# Patient Record
Sex: MALE | Race: WHITE | NOT HISPANIC OR LATINO | Employment: UNEMPLOYED | URBAN - METROPOLITAN AREA
[De-identification: names, ages, dates, MRNs, and addresses within clinical notes are randomized per-mention and may not be internally consistent; named-entity substitution may affect disease eponyms.]

---

## 2020-02-16 ENCOUNTER — OFFICE VISIT (OUTPATIENT)
Dept: URGENT CARE | Facility: CLINIC | Age: 65
End: 2020-02-16
Payer: MEDICARE

## 2020-02-16 VITALS
HEIGHT: 74 IN | BODY MASS INDEX: 40.43 KG/M2 | HEART RATE: 83 BPM | SYSTOLIC BLOOD PRESSURE: 152 MMHG | OXYGEN SATURATION: 98 % | DIASTOLIC BLOOD PRESSURE: 85 MMHG | TEMPERATURE: 98.3 F | WEIGHT: 315 LBS | RESPIRATION RATE: 24 BRPM

## 2020-02-16 DIAGNOSIS — M54.42 ACUTE LEFT-SIDED LOW BACK PAIN WITH LEFT-SIDED SCIATICA: ICD-10-CM

## 2020-02-16 DIAGNOSIS — E66.01 MORBID OBESITY (HCC): Primary | ICD-10-CM

## 2020-02-16 DIAGNOSIS — M54.12 CERVICAL RADICULOPATHY: ICD-10-CM

## 2020-02-16 PROCEDURE — G0463 HOSPITAL OUTPT CLINIC VISIT: HCPCS | Performed by: EMERGENCY MEDICINE

## 2020-02-16 PROCEDURE — 99203 OFFICE O/P NEW LOW 30 MIN: CPT | Performed by: EMERGENCY MEDICINE

## 2020-02-16 RX ORDER — FUROSEMIDE 40 MG/1
TABLET ORAL
COMMUNITY
Start: 2020-01-15

## 2020-02-16 RX ORDER — METOLAZONE 5 MG/1
TABLET ORAL
COMMUNITY
Start: 2020-01-15

## 2020-02-16 RX ORDER — CYCLOBENZAPRINE HCL 10 MG
10 TABLET ORAL 3 TIMES DAILY PRN
Qty: 20 TABLET | Refills: 0 | Status: SHIPPED | OUTPATIENT
Start: 2020-02-16

## 2020-02-16 NOTE — PATIENT INSTRUCTIONS
Cervical Radiculopathy   WHAT YOU NEED TO KNOW:   Cervical radiculopathy is a painful condition that happens when a spinal nerve in your neck is pinched or irritated  DISCHARGE INSTRUCTIONS:   Medicines: You may need any of the following:  · NSAIDs  help decrease swelling and pain  This medicine can be bought without a doctor's order  This medicine can cause stomach bleeding or kidney problems in certain people  If you take blood thinner medicine, always ask your healthcare provider if NSAIDs are safe for you  Always read the medicine label and follow the directions on it before using this medicine  · Prescription pain medicine  helps decrease pain  Do not wait until the pain is severe before you take this medicine  · Steroids  help decrease pain and swelling  These may be given as a pill or as an injection in your neck  You may need more than 1 injection if your symptoms do not improve after the first treatment  · Take your medicine as directed  Contact your healthcare provider if you think your medicine is not helping or if you have side effects  Tell him of her if you are allergic to any medicine  Keep a list of the medicines, vitamins, and herbs you take  Include the amounts, and when and why you take them  Bring the list or the pill bottles to follow-up visits  Carry your medicine list with you in case of an emergency  Follow up with your healthcare provider or spine specialist as directed:  Write down your questions so you remember to ask them during your visits  Physical therapy:  Your healthcare provider may suggest physical therapy to stretch and strengthen your muscles  Your physical therapist can teach you how to improve your posture and the way you hold your neck  He may also teach you how to be safely active and avoid further injury  He can also help you develop an exercise program that is safe for your back and neck  Self-care:   · Ice  helps decrease swelling and pain   Ice may also help prevent tissue damage  Use an ice pack, or put crushed ice in a plastic bag  Cover it with a towel and place it on your neck for 15 to 20 minutes every hour or as directed  · Rest  when you feel it is needed  Slowly start to do more each day  Return to your daily activities as directed  · Wear a soft collar  You may be given a soft collar to support your neck while you sleep  Wear the soft collar only as directed  · Do light stretches and regular exercise  Your healthcare provider may suggest light stretches to help decrease stiffness in your neck and arm as you recover  After your pain is controlled, you may benefit from regular exercise  Ask what type of exercise is safe for your back and neck  · Review your work area  A comfortable work area can help prevent neck strain  Ask your employer for an ergonomic review to check the position of your desk, chair, phone, and computer  Make any necessary adjustments for your comfort  Contact your healthcare provider or spine specialist if:   · You have a fever  · You are losing weight without trying  · Your pain is worse, even with medicine  · One or both hands feel more numb than before, or you cannot move your fingers well  · You have questions or concerns about your condition or care  © 2017 [employer]  Information is for End User's use only and may not be sold, redistributed or otherwise used for commercial purposes  All illustrations and images included in CareNotes® are the copyrighted property of [employer]  or Richar Muñoz  The above information is an  only  It is not intended as medical advice for individual conditions or treatments  Talk to your doctor, nurse or pharmacist before following any medical regimen to see if it is safe and effective for you  Sciatica   WHAT YOU NEED TO KNOW:   Sciatica is a condition that causes pain along your sciatic nerve   The sciatic nerve runs from your spine through both sides of your buttocks  It then runs down the back of your thigh, into your lower leg and foot  Your sciatic nerve may be compressed, inflamed, irritated, or stretched  DISCHARGE INSTRUCTIONS:   Medicines:   · NSAIDs:  These medicines decrease swelling and pain  NSAIDs are available without a doctor's order  Ask your healthcare provider which medicine is right for you  Ask how much to take and when to take it  Take as directed  NSAIDs can cause stomach bleeding or kidney problems if not taken correctly  · Acetaminophen: This medicine decreases pain  Acetaminophen is available without a doctor's order  Ask how much to take and when to take it  Follow directions  Acetaminophen can cause liver damage if not taken correctly  · Muscle relaxers  help decrease pain and muscle spasms  · Take your medicine as directed  Contact your healthcare provider if you think your medicine is not helping or if you have side effects  Tell him of her if you are allergic to any medicine  Keep a list of the medicines, vitamins, and herbs you take  Include the amounts, and when and why you take them  Bring the list or the pill bottles to follow-up visits  Carry your medicine list with you in case of an emergency  Follow up with your healthcare provider as directed:  Write down your questions so you remember to ask them during your visits  Manage your symptoms:   · Activity:  Decrease your activity  Do not lift heavy objects or twist your back for at least 6 weeks  Slowly return to your usual activity  · Ice:  Ice helps decrease swelling and pain  Ice may also help prevent tissue damage  Use an ice pack, or put crushed ice in a plastic bag  Cover it with a towel and place it on your low back or leg for 15 to 20 minutes every hour or as directed  · Heat:  Heat helps decrease pain and muscle spasms  Apply heat on the area for 20 to 30 minutes every 2 hours for as many days as directed  · Physical therapy:  You may need to see physical therapist to teach you exercises to help improve movement and strength, and to decrease pain  An occupational therapist teaches you skills to help with your daily activities  · Use assistive devices if directed: You may need to wear back support, such as a back brace  You may need crutches, a cane, or a walker to decrease stress on your lower back and leg muscles  Ask your healthcare provider for more information about assistive devices and how to use them correctly  Self-care:   · Avoid pressure on your back and legs:  Do not  lift heavy objects, or stand or sit for long periods of time  · Lift objects safely:  Keep your back straight and bend your knees when you  an object  Do not bend or twist your back when you lift  · Maintain a healthy weight:  Ask your healthcare provider how much you should weigh  Ask him to help you create a weight loss plan if you are overweight  · Exercise:  Ask your healthcare provider about the best stretching, warmup, and exercise plan for you  Contact your healthcare provider if:   · You have pain in your lower back at night or when resting  · You have pain in your lower back with numbness below the knee  · You have weakness in one leg only  · You have questions or concerns about your condition or care  Return to the emergency department if:   · You have trouble holding back your urine or bowel movements  · You have weakness in both legs  · You have numbness in your groin or buttocks  © 2017 [facility] Information is for End User's use only and may not be sold, redistributed or otherwise used for commercial purposes  All illustrations and images included in CareNotes® are the copyrighted property of [employer]  or Richar Muñoz  The above information is an  only  It is not intended as medical advice for individual conditions or treatments   Talk to your doctor, nurse or pharmacist before following any medical regimen to see if it is safe and effective for you

## 2020-02-16 NOTE — PROGRESS NOTES
[facility] [employer] [facility]        NAME: Cyndi Ward is a 71 y o  male  : 1955    MRN: [id number]  DATE: 2020  TIME: 12:02 PM    Assessment and Plan   Morbid obesity ([facility] [address] ) [E66 01]  1  Morbid obesity ([facility] [address] )     2  Acute left-sided low back pain with left-sided sciatica  cyclobenzaprine (FLEXERIL) 10 mg tablet    Ambulatory referral to Physical Therapy   3  Cervical radiculopathy  cyclobenzaprine (FLEXERIL) 10 mg tablet    Ambulatory referral to Physical Therapy         Patient Instructions     Patient Instructions     Cervical Radiculopathy   WHAT YOU NEED TO KNOW:   Cervical radiculopathy is a painful condition that happens when a spinal nerve in your neck is pinched or irritated  DISCHARGE INSTRUCTIONS:   Medicines: You may need any of the following:  · NSAIDs  help decrease swelling and pain  This medicine can be bought without a doctor's order  This medicine can cause stomach bleeding or kidney problems in certain people  If you take blood thinner medicine, always ask your healthcare provider if NSAIDs are safe for you  Always read the medicine label and follow the directions on it before using this medicine  · Prescription pain medicine  helps decrease pain  Do not wait until the pain is severe before you take this medicine  · Steroids  help decrease pain and swelling  These may be given as a pill or as an injection in your neck  You may need more than 1 injection if your symptoms do not improve after the first treatment  · Take your medicine as directed  Contact your healthcare provider if you think your medicine is not helping or if you have side effects  Tell him of her if you are allergic to any medicine  Keep a list of the medicines, vitamins, and herbs you take  Include the amounts, and when and why you take them  Bring the list or the pill bottles to follow-up visits  Carry your medicine list with you in case of an emergency    Follow up with your healthcare provider or spine specialist as directed:  Write down your questions so you remember to ask them during your visits  Physical therapy:  Your healthcare provider may suggest physical therapy to stretch and strengthen your muscles  Your physical therapist can teach you how to improve your posture and the way you hold your neck  He may also teach you how to be safely active and avoid further injury  He can also help you develop an exercise program that is safe for your back and neck  Self-care:   · Ice  helps decrease swelling and pain  Ice may also help prevent tissue damage  Use an ice pack, or put crushed ice in a plastic bag  Cover it with a towel and place it on your neck for 15 to 20 minutes every hour or as directed  · Rest  when you feel it is needed  Slowly start to do more each day  Return to your daily activities as directed  · Wear a soft collar  You may be given a soft collar to support your neck while you sleep  Wear the soft collar only as directed  · Do light stretches and regular exercise  Your healthcare provider may suggest light stretches to help decrease stiffness in your neck and arm as you recover  After your pain is controlled, you may benefit from regular exercise  Ask what type of exercise is safe for your back and neck  · Review your work area  A comfortable work area can help prevent neck strain  Ask your employer for an ergonomic review to check the position of your desk, chair, phone, and computer  Make any necessary adjustments for your comfort  Contact your healthcare provider or spine specialist if:   · You have a fever  · You are losing weight without trying  · Your pain is worse, even with medicine  · One or both hands feel more numb than before, or you cannot move your fingers well  · You have questions or concerns about your condition or care    © 2017 [employer] Information is for End User's use only and may not be sold, redistributed or otherwise used for commercial purposes  All illustrations and images included in CareNotes® are the copyrighted property of [employer]  or Richar Muñoz  The above information is an  only  It is not intended as medical advice for individual conditions or treatments  Talk to your doctor, nurse or pharmacist before following any medical regimen to see if it is safe and effective for you  Sciatica   WHAT YOU NEED TO KNOW:   Sciatica is a condition that causes pain along your sciatic nerve  The sciatic nerve runs from your spine through both sides of your buttocks  It then runs down the back of your thigh, into your lower leg and foot  Your sciatic nerve may be compressed, inflamed, irritated, or stretched  DISCHARGE INSTRUCTIONS:   Medicines:   · NSAIDs:  These medicines decrease swelling and pain  NSAIDs are available without a doctor's order  Ask your healthcare provider which medicine is right for you  Ask how much to take and when to take it  Take as directed  NSAIDs can cause stomach bleeding or kidney problems if not taken correctly  · Acetaminophen: This medicine decreases pain  Acetaminophen is available without a doctor's order  Ask how much to take and when to take it  Follow directions  Acetaminophen can cause liver damage if not taken correctly  · Muscle relaxers  help decrease pain and muscle spasms  · Take your medicine as directed  Contact your healthcare provider if you think your medicine is not helping or if you have side effects  Tell him of her if you are allergic to any medicine  Keep a list of the medicines, vitamins, and herbs you take  Include the amounts, and when and why you take them  Bring the list or the pill bottles to follow-up visits  Carry your medicine list with you in case of an emergency  Follow up with your healthcare provider as directed:  Write down your questions so you remember to ask them during your visits     Manage your symptoms:   · Activity:  Decrease your activity  Do not lift heavy objects or twist your back for at least 6 weeks  Slowly return to your usual activity  · Ice:  Ice helps decrease swelling and pain  Ice may also help prevent tissue damage  Use an ice pack, or put crushed ice in a plastic bag  Cover it with a towel and place it on your low back or leg for 15 to 20 minutes every hour or as directed  · Heat:  Heat helps decrease pain and muscle spasms  Apply heat on the area for 20 to 30 minutes every 2 hours for as many days as directed  · Physical therapy:  You may need to see physical therapist to teach you exercises to help improve movement and strength, and to decrease pain  An occupational therapist teaches you skills to help with your daily activities  · Use assistive devices if directed: You may need to wear back support, such as a back brace  You may need crutches, a cane, or a walker to decrease stress on your lower back and leg muscles  Ask your healthcare provider for more information about assistive devices and how to use them correctly  Self-care:   · Avoid pressure on your back and legs:  Do not  lift heavy objects, or stand or sit for long periods of time  · Lift objects safely:  Keep your back straight and bend your knees when you  an object  Do not bend or twist your back when you lift  · Maintain a healthy weight:  Ask your healthcare provider how much you should weigh  Ask him to help you create a weight loss plan if you are overweight  · Exercise:  Ask your healthcare provider about the best stretching, warmup, and exercise plan for you  Contact your healthcare provider if:   · You have pain in your lower back at night or when resting  · You have pain in your lower back with numbness below the knee  · You have weakness in one leg only  · You have questions or concerns about your condition or care    Return to the emergency department if:   · You have trouble holding back your urine or bowel movements  · You have weakness in both legs  · You have numbness in your groin or buttocks  © 2017 [employer]  Information is for End User's use only and may not be sold, redistributed or otherwise used for commercial purposes  All illustrations and images included in CareNotes® are the copyrighted property of [employer]  or Richar Muñoz  The above information is an  only  It is not intended as medical advice for individual conditions or treatments  Talk to your doctor, nurse or pharmacist before following any medical regimen to see if it is safe and effective for you  Follow up with PCP in 3-5 days  Proceed to  ER if symptoms worsen  Chief Complaint     Chief Complaint   Patient presents with    Back Pain     low back pain that started 1 week ago that frequently radiates down left leg  Pt also reports right hand numbness  +pain across shoulder           History of Present Illness       Patient complains of gradual onset left low back pain with radiation down left leg and right shoulder pain with radiation to right arm  He denies similar symptoms in the past but does repetitive and heavy lifting at work  Review of Systems   Review of Systems   Constitutional: Negative for chills and fever  Respiratory: Negative  Cardiovascular: Negative  Musculoskeletal: Positive for back pain, neck pain and neck stiffness  Negative for arthralgias, gait problem, joint swelling and myalgias  Skin: Negative for color change, pallor, rash and wound  Neurological: Negative for weakness, numbness and headaches           Current Medications       Current Outpatient Medications:     furosemide (LASIX) 40 mg tablet, , Disp: , Rfl:     metolazone (ZAROXOLYN) 5 mg tablet, , Disp: , Rfl:     cyclobenzaprine (FLEXERIL) 10 mg tablet, Take 1 tablet (10 mg total) by mouth 3 (three) times a day as needed for muscle spasms, Disp: 20 tablet, Rfl: 0    Current Allergies     Allergies as of 02/16/2020 - Reviewed 02/16/2020   Allergen Reaction Noted    Cephalexin Rash 02/16/2020            The following portions of the patient's history were reviewed and updated as appropriate: allergies, current medications, past family history, past medical history, past social history, past surgical history and problem list      Past Medical History:   Diagnosis Date    Cellulitis        History reviewed  No pertinent surgical history  History reviewed  No pertinent family history  Medications have been verified  Objective   /85   Pulse 83   Temp 98 3 °F (36 8 °C)   Resp (!) 24   Ht 6' 2" (1 88 m)   Wt (!) 188 kg (415 lb)   SpO2 98%   BMI 53 28 kg/m²        Physical Exam     Physical Exam   Constitutional: He is oriented to person, place, and time  He appears well-developed and well-nourished  No distress  Neck: Neck supple  Cardiovascular: Normal rate and regular rhythm  Pulmonary/Chest: Effort normal and breath sounds normal    Musculoskeletal: He exhibits tenderness  He exhibits no deformity  Tender SI joint on the left and at posterior shoulder on the right   Neurological: He is alert and oriented to person, place, and time  He has normal reflexes  Skin: Skin is warm and dry  No rash noted  No erythema  Psychiatric: He has a normal mood and affect  His behavior is normal  Judgment and thought content normal    Nursing note and vitals reviewed

## 2020-02-18 ENCOUNTER — EVALUATION (OUTPATIENT)
Dept: PHYSICAL THERAPY | Facility: CLINIC | Age: 65
End: 2020-02-18
Payer: MEDICARE

## 2020-02-18 DIAGNOSIS — M54.42 ACUTE LEFT-SIDED LOW BACK PAIN WITH LEFT-SIDED SCIATICA: ICD-10-CM

## 2020-02-18 DIAGNOSIS — M54.12 CERVICAL RADICULOPATHY: Primary | ICD-10-CM

## 2020-02-18 PROCEDURE — 97110 THERAPEUTIC EXERCISES: CPT | Performed by: PHYSICAL THERAPIST

## 2020-02-18 PROCEDURE — 97162 PT EVAL MOD COMPLEX 30 MIN: CPT | Performed by: PHYSICAL THERAPIST

## 2020-02-18 NOTE — LETTER
2020    Ian Collado DO  [facility]    Patient: David Barber III   YOB: 1955   Date of Visit: 2020     Encounter Diagnosis     ICD-10-CM    1  Cervical radiculopathy M54 12 Ambulatory referral to Physical Therapy   2  Acute left-sided low back pain with left-sided sciatica M54 42 Ambulatory referral to Physical Therapy       Dear Dr Kathleen Snyder: Thank you for your recent referral of David Barber III  Please review the attached evaluation summary from Mik's recent visit  Please verify that you agree with the plan of care by signing the attached order  If you have any questions or concerns, please do not hesitate to call  I sincerely appreciate the opportunity to share in the care of one of your patients and hope to have another opportunity to work with you in the near future  Sincerely,    Ale Cortés, PT      Referring Provider:      I certify that I have read the below Plan of Care and certify the need for these services furnished under this plan of treatment while under my care  Ian Collado DO  [place] [address]  VIA Facsimile: [phone]          PT Evaluation     Today's date: 2020  Patient name: Rupal ServinB: 1955  MRN: [id number]  Referring provider: Delilah Siu MD  Dx:   Encounter Diagnosis     ICD-10-CM    1  Cervical radiculopathy M54 12 Ambulatory referral to Physical Therapy   2  Acute left-sided low back pain with left-sided sciatica M54 42 Ambulatory referral to Physical Therapy                  Assessment  Assessment details: David Barber III presents with complaints of neck pain with radiating symptoms into R arm and low back pain with symptoms radiating into L leg  Neck and arm pain began insidiously 2 weeks ago   Since onset of pain, pt notes difficulty with gripping and lifting work related tasks due to tingling sensation into R hand  Pt's symptoms were not reproduced with cervical AROM, cervical spine joint mobilization or palpation of soft tissue  Pt symptoms are consistent with thoracic outlet syndrome due to postural muscular imbalance  Pt is currently most limited in work related and self care activities due to neck and upper extremity pain  Pt agreeable to starting treatment for neck involvement and address low back pain at future session is newly prescribed muscle relaxer does not help to relieve pain  Pt would benefit from skilled PT intervention to correct muscular imbalances, improve postural awareness and assure proper body mechanics for upper and lower body during work tasks  Understanding of Dx/Px/POC: good   Prognosis: good    Goals  STG to be achieved in 2 weeks:  1  Pt will increase cervical ROM by 5-10 degrees as compared to initial evaluation  2  Pt will demonstrate improved static standing/seated posture without need for verbal cues    LTG to be achieved within 6 weeks:  1  Pt will be able to complete work related tasks without complaints of pain in neck / RUE  2  Pt will be independent with HEP at time of discharge  Plan  Patient would benefit from: skilled physical therapy  Planned modality interventions: thermotherapy: hydrocollator packs and cryotherapy  Planned therapy interventions: joint mobilization, manual therapy, home exercise program, therapeutic exercise, therapeutic activities, strengthening, patient education, postural training and neuromuscular re-education  Frequency: 2x week  Duration in visits: 12  Duration in weeks: 6  Plan of Care beginning date: 2/18/2020  Plan of Care expiration date: 3/31/2020  Treatment plan discussed with: patient and family        Subjective Evaluation    History of Present Illness  Mechanism of injury: Pt present with recent onset of neck pain with radiating symptoms down into R hand  Pt reports insidious onset of pain starting about 2 weeks ago   Pt notes constant tingling sensation in R hand that "feels like it's asleep " Pt has noticed that gripping items with R hand is harder since onset of increased pain  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  Pt has not been to work in the last week due to pain and schedule flexibility due to off season  Pt has been taking ibuprofen intermittently and muscle relaxer since yesterday and notes some improvement in symptoms with medication  Pt has experienced on and off tingling over the years at work  Pain  Current pain ratin  At best pain ratin  At worst pain rating: 3  Relieving factors: medications and rest    Social Support    Employment status: working  Hand dominance: right    Treatments  Previous treatment: medication  Current treatment: medication and physical therapy  Patient Goals  Patient goals for therapy: decreased pain, increased strength, return to work and increased motion          Objective     Concurrent Complaints  Negative for dizziness, headaches and visual change    Postural Observations  Seated posture: poor  Standing posture: poor    Additional Postural Observation Details  Forward head rounded shoulder posture with cervical flexion and capital extension  Pt needed constant verbal cues to correct  Palpation     Additional Palpation Details  Pain was not recreated with soft tissue palpation of C/S, T/S, upper trap, paraspinals, scalenes or pec muscles  Tenderness   Cervical Spine   No tenderness in the spinous process       Neurological Testing     Sensation   Cervical/Thoracic   Left   Intact: light touch    Right   Intact: light touch    Active Range of Motion   Cervical/Thoracic Spine       Cervical    Flexion:  Restriction level: minimal  Extension:  Restriction level: minimal  Left lateral flexion:  Restriction level: moderate  Right lateral flexion:  Restriction level moderate  Left rotation:  Restriction level: moderate  Right rotation: Restriction level: moderate    Additional Active Range of Motion Details  Pt reported tightness with cervical ROM but no pain or radiating symptoms  Symptoms did not change with repeated flexion or extension  Strength/Myotome Testing     Left Shoulder     Planes of Motion   Flexion: 3+   Abduction: 3+     Right Shoulder     Planes of Motion   Flexion: 3+   Abduction: 3+     Tests   Cervical   Negative repeated extension and repeated flexion  Left Shoulder   Positive ULTT1  Right Shoulder   Positive ULTT1  General Comments:      Cervical/Thoracic Comments  FOTO scores initial eval:  Neck  Current: 58  Projected 71    Back  Current: 30  Projected: 59  Neuro Exam:     Headaches   Patient reports headaches: No        Flowsheet Rows      Most Recent Value   PT/OT G-Codes   Current Score  30   Projected Score  59        Patient was treated by HAZEL Guillaume under direct supervision of Donald Hull PT, DPT          Precautions: Fall risk     Daily Treatment Diary:      Initial Evaluation Date: 02/18/20  Compliance 2/18                     Visit Number 1                    Re-Eval  IE                 [facility]   Foto Captured Y                        Manual  2/18                                                                                                                                    Exercise Diary  2/18                     Self caudal distraction 10x10"                     C/S AROM 20x ea                     Scap retraction 20x5"                     Doorway stretch 4x30"                                                                                                                                                                                                                                                                                                                                                                                              Modalities  2/18

## 2020-02-18 NOTE — PROGRESS NOTES
PT Evaluation     Today's date: 2020  Patient name: Faraz Francis  : 1955  MRN: [id number]  Referring provider: Carrol Sanderson MD  Dx:   Encounter Diagnosis     ICD-10-CM    1  Cervical radiculopathy M54 12 Ambulatory referral to Physical Therapy   2  Acute left-sided low back pain with left-sided sciatica M54 42 Ambulatory referral to Physical Therapy                  Assessment  Assessment details: Roberta Madsen III presents with complaints of neck pain with radiating symptoms into R arm and low back pain with symptoms radiating into L leg  Neck and arm pain began insidiously 2 weeks ago  Since onset of pain, pt notes difficulty with gripping and lifting work related tasks due to tingling sensation into R hand  Pt's symptoms were not reproduced with cervical AROM, cervical spine joint mobilization or palpation of soft tissue  Pt symptoms are consistent with thoracic outlet syndrome due to postural muscular imbalance  Pt is currently most limited in work related and self care activities due to neck and upper extremity pain  Pt agreeable to starting treatment for neck involvement and address low back pain at future session is newly prescribed muscle relaxer does not help to relieve pain  Pt would benefit from skilled PT intervention to correct muscular imbalances, improve postural awareness and assure proper body mechanics for upper and lower body during work tasks  Understanding of Dx/Px/POC: good   Prognosis: good    Goals  STG to be achieved in 2 weeks:  1  Pt will increase cervical ROM by 5-10 degrees as compared to initial evaluation  2  Pt will demonstrate improved static standing/seated posture without need for verbal cues    LTG to be achieved within 6 weeks:  1  Pt will be able to complete work related tasks without complaints of pain in neck / RUE  2  Pt will be independent with HEP at time of discharge      Plan  Patient would benefit from: skilled physical therapy  Planned modality interventions: thermotherapy: hydrocollator packs and cryotherapy  Planned therapy interventions: joint mobilization, manual therapy, home exercise program, therapeutic exercise, therapeutic activities, strengthening, patient education, postural training and neuromuscular re-education  Frequency: 2x week  Duration in visits: 12  Duration in weeks: 6  Plan of Care beginning date: 2020  Plan of Care expiration date: 3/31/2020  Treatment plan discussed with: patient and family        Subjective Evaluation    History of Present Illness  Mechanism of injury: Pt present with recent onset of neck pain with radiating symptoms down into R hand  Pt reports insidious onset of pain starting about 2 weeks ago  Pt notes constant tingling sensation in R hand that "feels like it's asleep " Pt has noticed that gripping items with R hand is harder since onset of increased pain  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  Pt has not been to work in the last week due to pain and schedule flexibility due to off season  Pt has been taking ibuprofen intermittently and muscle relaxer since yesterday and notes some improvement in symptoms with medication  Pt has experienced on and off tingling over the years at work     Pain  Current pain ratin  At best pain ratin  At worst pain rating: 3  Relieving factors: medications and rest    Social Support    Employment status: working  Hand dominance: right    Treatments  Previous treatment: medication  Current treatment: medication and physical therapy  Patient Goals  Patient goals for therapy: decreased pain, increased strength, return to work and increased motion          Objective     Concurrent Complaints  Negative for dizziness, headaches and visual change    Postural Observations  Seated posture: poor  Standing posture: poor    Additional Postural Observation Details  Forward head rounded shoulder posture with cervical flexion and capital extension  Pt needed constant verbal cues to correct  Palpation     Additional Palpation Details  Pain was not recreated with soft tissue palpation of C/S, T/S, upper trap, paraspinals, scalenes or pec muscles  Tenderness   Cervical Spine   No tenderness in the spinous process  Neurological Testing     Sensation   Cervical/Thoracic   Left   Intact: light touch    Right   Intact: light touch    Active Range of Motion   Cervical/Thoracic Spine       Cervical    Flexion:  Restriction level: minimal  Extension:  Restriction level: minimal  Left lateral flexion:  Restriction level: moderate  Right lateral flexion:  Restriction level moderate  Left rotation:  Restriction level: moderate  Right rotation:  Restriction level: moderate    Additional Active Range of Motion Details  Pt reported tightness with cervical ROM but no pain or radiating symptoms  Symptoms did not change with repeated flexion or extension  Strength/Myotome Testing     Left Shoulder     Planes of Motion   Flexion: 3+   Abduction: 3+     Right Shoulder     Planes of Motion   Flexion: 3+   Abduction: 3+     Tests   Cervical   Negative repeated extension and repeated flexion  Left Shoulder   Positive ULTT1  Right Shoulder   Positive ULTT1  General Comments:      Cervical/Thoracic Comments  FOTO scores initial eval:  Neck  Current: 58  Projected 71    Back  Current: 30  Projected: 59  Neuro Exam:     Headaches   Patient reports headaches: No        Flowsheet Rows      Most Recent Value   PT/OT G-Codes   Current Score  30   Projected Score  59        Patient was treated by Mai Mckinley SPT under direct supervision of Ale Cortés, PT, DPT          Precautions: Fall risk     Daily Treatment Diary:      Initial Evaluation Date: 02/18/20  Compliance 2/18                     Visit Number 1                    Re-Eval  IE                 [address] [place] [address] Y                        Manual  2/18 Exercise Diary  2/18                     Self caudal distraction 10x10"                     C/S AROM 20x ea                     Scap retraction 20x5"                     Doorway stretch 4x30"                                                                                                                                                                                                                                                                                                                                                                                              Modalities  2/18

## 2020-02-19 ENCOUNTER — TRANSCRIBE ORDERS (OUTPATIENT)
Dept: PHYSICAL THERAPY | Facility: CLINIC | Age: 65
End: 2020-02-19

## 2020-02-19 DIAGNOSIS — M54.12 BRACHIAL NEURITIS: Primary | ICD-10-CM

## 2020-02-20 ENCOUNTER — OFFICE VISIT (OUTPATIENT)
Dept: PHYSICAL THERAPY | Facility: CLINIC | Age: 65
End: 2020-02-20
Payer: MEDICARE

## 2020-02-20 DIAGNOSIS — M54.12 CERVICAL RADICULOPATHY: ICD-10-CM

## 2020-02-20 DIAGNOSIS — M54.42 ACUTE LEFT-SIDED LOW BACK PAIN WITH LEFT-SIDED SCIATICA: Primary | ICD-10-CM

## 2020-02-20 PROCEDURE — 97110 THERAPEUTIC EXERCISES: CPT

## 2020-02-20 PROCEDURE — 97112 NEUROMUSCULAR REEDUCATION: CPT

## 2020-02-20 NOTE — PROGRESS NOTES
Daily Note     Today's date: 2020  Patient name: Maria R Joshi  : 1955  MRN: [id number]  Referring provider: Jeanine Hester MD  Dx:   Encounter Diagnosis     ICD-10-CM    1  Acute left-sided low back pain with left-sided sciatica M54 42    2  Cervical radiculopathy M54 12                   Subjective: Pt reports that he is having some tingling in his R fingers with minimal tingling in his L fingers  Notes that he has been complaint with his HEP as well  Notes 7/10 pain currently in the middle of his neck and a 5/10 in his low back  Objective: See treatment diary below      Assessment: Tolerated treatment fair beginning with reviewing his HEP  Increased lower cervical pain present to start session with continued education given on importance of proper posture  Focusing on cervical mobility as well as scapular and postural strengthening with cuing needed  Will follow up with pt symptoms NV  Patient demonstrated fatigue post treatment, exhibited good technique with therapeutic exercises and would benefit from continued PT      Plan: Continue per plan of care        Precautions: Fall risk     Daily Treatment Diary:      Initial Evaluation Date: 20  Compliance                    Visit Number 1 2                   Re-Eval  IE no                [facility]   Foto Captured Y no                       Manual                                                                                                                                    Exercise Diary                     Self caudal distraction 10x10"  10x10"                   C/S AROM 20x ea  20x ea                   Scap retraction 20x5"  20x5"                   Doorway stretch 4x30"  4x30"                   Seated TB Row/ Ext   gtb 2x10 ea                   Levator stretch   10"x10                   No monies   peach 2x10 5"                   Posture Edu   5' Modalities  2/18

## 2020-02-24 ENCOUNTER — APPOINTMENT (OUTPATIENT)
Dept: PHYSICAL THERAPY | Facility: CLINIC | Age: 65
End: 2020-02-24
Payer: MEDICARE

## 2020-02-24 ENCOUNTER — HOSPITAL ENCOUNTER (EMERGENCY)
Facility: HOSPITAL | Age: 65
Discharge: HOME | End: 2020-02-24
Attending: EMERGENCY MEDICINE | Admitting: EMERGENCY MEDICINE
Payer: MEDICARE

## 2020-02-24 ENCOUNTER — APPOINTMENT (EMERGENCY)
Dept: CT IMAGING | Facility: HOSPITAL | Age: 65
End: 2020-02-24
Payer: MEDICARE

## 2020-02-24 VITALS
WEIGHT: 315 LBS | DIASTOLIC BLOOD PRESSURE: 74 MMHG | RESPIRATION RATE: 18 BRPM | TEMPERATURE: 97.7 F | SYSTOLIC BLOOD PRESSURE: 146 MMHG | OXYGEN SATURATION: 97 % | HEART RATE: 77 BPM | HEIGHT: 74 IN | BODY MASS INDEX: 40.43 KG/M2

## 2020-02-24 DIAGNOSIS — M54.9 BACK PAIN: Primary | ICD-10-CM

## 2020-02-24 PROCEDURE — 99284 EMERGENCY DEPT VISIT MOD MDM: CPT | Performed by: EMERGENCY MEDICINE

## 2020-02-24 PROCEDURE — 99284 EMERGENCY DEPT VISIT MOD MDM: CPT

## 2020-02-24 PROCEDURE — 72131 CT LUMBAR SPINE W/O DYE: CPT

## 2020-02-24 RX ORDER — OXYCODONE HYDROCHLORIDE 10 MG/1
10 TABLET ORAL ONCE
Status: COMPLETED | OUTPATIENT
Start: 2020-02-24 | End: 2020-02-24

## 2020-02-24 RX ORDER — DIAZEPAM 5 MG/1
5-10 TABLET ORAL EVERY 8 HOURS PRN
Qty: 20 TABLET | Refills: 0 | Status: SHIPPED | OUTPATIENT
Start: 2020-02-24 | End: 2020-03-05

## 2020-02-24 RX ORDER — FUROSEMIDE 40 MG/1
TABLET ORAL
COMMUNITY

## 2020-02-24 RX ORDER — METOLAZONE 5 MG/1
TABLET ORAL
COMMUNITY

## 2020-02-24 RX ORDER — OXYCODONE HYDROCHLORIDE 5 MG/1
5-10 TABLET ORAL EVERY 4 HOURS PRN
Qty: 15 TABLET | Refills: 0 | Status: SHIPPED | OUTPATIENT
Start: 2020-02-24

## 2020-02-24 RX ADMIN — OXYCODONE HYDROCHLORIDE 10 MG: 10 TABLET ORAL at 10:39

## 2020-02-24 NOTE — DISCHARGE INSTRUCTIONS
Return immediately if worse or any new symptoms  Tylenol 1000 mg every 6 hours as needed  and/or  Advil 400 mg every 6 hours as needed  May take both together  Ice 20 minutes hourly as needed  Continue physical therapy and follow-up as currently and with urgent care / family practice within 3-5 days

## 2020-02-24 NOTE — ED PROVIDER NOTES
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  Denies lower extremity numbness and weakness  Denies bowel or bladder incontinence or retention, mentioned retracted penis over the past year, but asymptomatic  Notes upper extremities have been tingly in all fingers  Initially seen examined at urgent care, taking Flexeril and referred for physical therapy which began and has had 2 sessions  Over the course of the past 2 weeks initially had to use cane and then walker secondary to pain and low back while active  No fever chills      Back Pain   Location:  Lumbar spine  Quality:  Aching  Pain severity:  Moderate  Onset quality:  Gradual  Timing:  Constant  Progression:  Worsening  Chronicity:  New  Relieved by: Rest   Worsened by: Movement  Associated symptoms: paresthesias and tingling    Associated symptoms: no abdominal pain, no chest pain, no dysuria, no fever, no numbness and no weakness    Risk factors: obesity    Risk factors: no hx of cancer        Prior to Admission Medications   Prescriptions Last Dose Informant Patient Reported? Taking?    cyclobenzaprine (FLEXERIL) 10 mg tablet Not Taking at Unknown time  No No   Sig: Take 1 tablet (10 mg total) by mouth 3 (three) times a day as needed for muscle spasms   Patient not taking: Reported on 2/24/2020   furosemide (LASIX) 40 mg tablet   Yes Yes   furosemide (LASIX) 40 mg tablet   Yes Yes   Sig: furosemide 40 mg tablet   metolazone (ZAROXOLYN) 5 mg tablet   Yes Yes   metolazone (ZAROXOLYN) 5 mg tablet   Yes No   Sig: metolazone 5 mg tablet      Facility-Administered Medications: None       Past Medical History:   Diagnosis Date    Cellulitis     Cellulitis     CPAP (continuous positive airway pressure) dependence        Past Surgical History:   Procedure Laterality Date    FRACTURE SURGERY Left     cheekbone    HERNIA REPAIR      umbilical    WISDOM TOOTH EXTRACTION         History reviewed  No pertinent family history  I have reviewed and agree with the history as documented  E-Cigarette/Vaping    E-Cigarette Use Never User      E-Cigarette/Vaping Substances     Social History     Tobacco Use    Smoking status: Never Smoker    Smokeless tobacco: Never Used   Substance Use Topics    Alcohol use: Yes     Comment: rarely     Drug use: Never       Review of Systems   Constitutional: Negative for fever  Cardiovascular: Negative for chest pain  Gastrointestinal: Negative for abdominal pain  Genitourinary: Negative for dysuria  Musculoskeletal: Positive for back pain  Neurological: Positive for tingling and paresthesias  Negative for weakness and numbness  Physical Exam  Physical Exam   Constitutional: He is oriented to person, place, and time  Pleasant, comfortable-appearing  Obese but agile, moves well   HENT:   Head: Normocephalic and atraumatic  Mouth/Throat: Oropharynx is clear and moist    Eyes: Pupils are equal, round, and reactive to light  Conjunctivae and EOM are normal    Neck: Neck supple  Cardiovascular: Normal rate, regular rhythm and normal heart sounds  Pulmonary/Chest: Effort normal and breath sounds normal    Abdominal: Soft  Bowel sounds are normal  He exhibits no distension  There is no tenderness  Musculoskeletal: Normal range of motion  Neurological: He is alert and oriented to person, place, and time  He displays normal reflexes  No cranial nerve deficit or sensory deficit   He exhibits normal muscle tone  Coordination normal    Ambulates well he using walker, symmetric, well-balanced  Deep tendon reflexes difficult to assess at patella and Achilles but symmetrical   Skin: Skin is warm and dry  Psychiatric: He has a normal mood and affect  His behavior is normal  Judgment and thought content normal    Nursing note and vitals reviewed  Vital Signs  ED Triage Vitals [02/24/20 0946]   Temperature Pulse Respirations Blood Pressure SpO2   97 7 °F (36 5 °C) 77 18 (!) 175/87 96 %      Temp Source Heart Rate Source Patient Position - Orthostatic VS BP Location FiO2 (%)   Oral Monitor Lying Right arm --      Pain Score       2           Vitals:    02/24/20 0946 02/24/20 1157 02/24/20 1239 02/24/20 1318   BP: (!) 175/87 165/92 160/89 146/74   Pulse: 77 88 66 77   Patient Position - Orthostatic VS: Lying Sitting Lying          Visual Acuity      ED Medications  Medications   oxyCODONE (ROXICODONE) immediate release tablet 10 mg (10 mg Oral Given 2/24/20 1039)       Diagnostic Studies  Results Reviewed     None                 CT lumbar spine without contrast   Final Result by Olu Zapata MD (02/24 1224)      Multilevel degenerative changes of the lumbar spine, as described above  No acute fractures identified  Fusion of the sacroiliac joints bilaterally as well as multilevel anterior syndesmophytes and fusion of posterior elements  Constellation of findings is suspicious for the presence of ankylosing spondylitis           Workstation performed: [id number]                    Procedures  Procedures         ED Course  ED Course as of Feb 24 1642   Mon Feb 24, 2020   1305 Feels better, pain diminished, moving more comfortably  Appears comfortable  We discussed results, wife present and very agreeable with close outpatient follow-up and will continue physical therapy, follow up at [facility] urgent care, better parking and transfer into office MDM      Disposition  Final diagnoses:   Back pain     Time reflects when diagnosis was documented in both MDM as applicable and the Disposition within this note     Time User Action Codes Description Comment    2/24/2020  1:06 PM Rhina Roldan Add [M54 9] Back pain       ED Disposition     ED Disposition Condition Date/Time Comment    Discharge Stable Mon Feb 24, 2020  1:06 PM Michelle Iqbal III discharge to home/self care  Follow-up Information     Follow up With Specialties Details Why Contact Info    Olga Pedraza DO  Schedule an appointment as soon as possible for a visit in 1 week  [address] [place] [address]  [phone]            Discharge Medication List as of 2/24/2020  1:09 PM      START taking these medications    Details   diazepam (VALIUM) 5 mg tablet Take 1-2 tablets (5-10 mg total) by mouth every 8 (eight) hours as needed for anxiety for up to 10 days, Starting Mon 2/24/2020, Until Thu 3/5/2020, Normal      oxyCODONE (ROXICODONE) 5 mg immediate release tablet Take 1-2 tablets (5-10 mg total) by mouth every 4 (four) hours as needed for moderate pain for up to 8 dosesMax Daily Amount: 60 mg, Starting Mon 2/24/2020, Normal         CONTINUE these medications which have NOT CHANGED    Details   !! furosemide (LASIX) 40 mg tablet Starting Wed 1/15/2020, Historical Med      !! furosemide (LASIX) 40 mg tablet furosemide 40 mg tablet, Historical Med      !! metolazone (ZAROXOLYN) 5 mg tablet Starting Wed 1/15/2020, Historical Med      cyclobenzaprine (FLEXERIL) 10 mg tablet Take 1 tablet (10 mg total) by mouth 3 (three) times a day as needed for muscle spasms, Starting Sun 2/16/2020, Normal      !! metolazone (ZAROXOLYN) 5 mg tablet metolazone 5 mg tablet, Historical Med       !! - Potential duplicate medications found  Please discuss with provider  No discharge procedures on file      PDMP Review     None          ED Provider  Electronically Signed by           Giovany Burk DO  02/24/20 6583

## 2020-02-27 ENCOUNTER — OFFICE VISIT (OUTPATIENT)
Dept: PHYSICAL THERAPY | Facility: CLINIC | Age: 65
End: 2020-02-27
Payer: MEDICARE

## 2020-02-27 DIAGNOSIS — M54.12 CERVICAL RADICULOPATHY: ICD-10-CM

## 2020-02-27 DIAGNOSIS — M54.42 ACUTE LEFT-SIDED LOW BACK PAIN WITH LEFT-SIDED SCIATICA: Primary | ICD-10-CM

## 2020-02-27 PROCEDURE — 97110 THERAPEUTIC EXERCISES: CPT | Performed by: PHYSICAL THERAPIST

## 2020-02-27 PROCEDURE — 97112 NEUROMUSCULAR REEDUCATION: CPT | Performed by: PHYSICAL THERAPIST

## 2020-02-27 NOTE — PROGRESS NOTES
Daily Note     Today's date: 2020  Patient name: Enzo Yeung  : 1955  MRN: [id number]  Referring provider: Duane Gourd, MD  Dx:   Encounter Diagnosis     ICD-10-CM    1  Cervical radiculopathy M54 12    2  Acute left-sided low back pain with left-sided sciatica M54 42                   Subjective: Pt  Notes he isnt having much pain or N/T in BLE  He does note feeling of rapid fatigue in LE with sit to stand or ambulation  He feels this is not rapid onset but has noticed worsening fatigue over past few months  ER visit was unremarkable and pt was recommended to continue with us  Objective: See treatment diary below      Assessment: Lalayal Brood III was progressed with PT interventions, focusing on appropriate technique and intensity  Pt  Required mod A of 2 for sit to stand training as well as frequent cuing from therapist to complete  Weakness in BLe is equal B and no worsening n/t in Feet noted  Will continue to focus on cervical radiculopathy as well as improving functional capacity  Pt  Would benefit from continued physical therapy to promote improved activity tolerance and function  Plan: Continue per plan of care  Progress treatment as tolerated         Precautions: Fall risk     Daily Treatment Diary:      Initial Evaluation Date: 20  Compliance                  Visit Number 1 2  3                 Re-Eval  IE no  -              MC   Foto Captured Y no  -                     Manual                                                                                                                                  Exercise Diary                   Self caudal distraction 10x10"   10x10"  10x10" c lev scap                 C/S AROM 20x ea   20x ea  20x ea                 Scap retraction 20x5"   20x5"  20x5"                 Doorway stretch 4x30"   4x30"  NV                 Seated TB Row/ Ext   gtb 2x10 ea  [id number] Levator stretch   10"x10  4x20"                 No monies   peach 2x10 5" AROM 20x2"                 Posture Edu   5'  ~5'                 Seated marches   20x                  Hip ABd/ADd    15x5" ea                 Glute iso    15x5"                 Sit to stand     ~7x, modAx2                                                                                                                                                                                                          Modalities  2/18 2/27

## 2020-03-02 ENCOUNTER — OFFICE VISIT (OUTPATIENT)
Dept: PHYSICAL THERAPY | Facility: CLINIC | Age: 65
End: 2020-03-02
Payer: MEDICARE

## 2020-03-02 DIAGNOSIS — M54.42 ACUTE LEFT-SIDED LOW BACK PAIN WITH LEFT-SIDED SCIATICA: Primary | ICD-10-CM

## 2020-03-02 DIAGNOSIS — M54.12 CERVICAL RADICULOPATHY: ICD-10-CM

## 2020-03-02 PROCEDURE — 97110 THERAPEUTIC EXERCISES: CPT

## 2020-03-02 PROCEDURE — 97112 NEUROMUSCULAR REEDUCATION: CPT

## 2020-03-02 NOTE — PROGRESS NOTES
Daily Note     Today's date: 3/2/2020  Patient name: Ivett Jorgensen  : 1955  MRN: [id number]  Referring provider: Marvell Sever, MD  Dx:   Encounter Diagnosis     ICD-10-CM    1  Acute left-sided low back pain with left-sided sciatica M54 42    2  Cervical radiculopathy M54 12                   Subjective: Patient reports had stiffness anterior legs  Reports tingling continues all day  Objective: See treatment diary below      Assessment: Sonia Dennison III continues with some progress towards goals  Patient pain appears to be slightly decreased in knee with continued radicular symptoms in bilateral hands R>L  Radicular symptoms appear to be secondary to poor posture  he required moderate verbal cueing to complete exercises appropriate form and intensity  Patient symptoms should decrease with continued treatments  Plan: Continue per plan of care  Precautions: Fall risk     Daily Treatment Diary:        Initial Evaluation Date: 20  Compliance   32               Visit Number 1 2   3  4               Re-Eval  IE no   -  n            MC   Foto Captured Y no   -  n                   Manual    3                                                                                                                              Exercise Diary    3/2               Self caudal distraction 10x10"   10x10"   10x10" c lev scap 10x10" c lev scap               C/S AROM 20x ea   20x ea   20x ea 20xea               Scap retraction 20x5"   20x5"   20x5"  20x5"               Doorway stretch 4x30"   4x30"   NV  4x30"               Seated TB Row/ Ext   gtb 2x10 ea   [id number]  Bbujuo73l               Levator stretch   10"x10   4x20"  30"x4               No monies   peach 2x10 5" AROM 20x2"  AROM 30x2"               Posture Edu   5'   ~5'                 Seated marches   20x   20x               Hip ABd/ADd    15x5" ea  15x5" ea               Glute iso 15x5" 15x5"               Sit to stand      ~7x, modAx2  5/3/2  Mod Ax2                                                                                                                                                                                                        Modalities  2/18     2/27  3/2

## 2020-03-05 ENCOUNTER — OFFICE VISIT (OUTPATIENT)
Dept: PHYSICAL THERAPY | Facility: CLINIC | Age: 65
End: 2020-03-05
Payer: MEDICARE

## 2020-03-05 DIAGNOSIS — M54.12 CERVICAL RADICULOPATHY: ICD-10-CM

## 2020-03-05 DIAGNOSIS — M54.42 ACUTE LEFT-SIDED LOW BACK PAIN WITH LEFT-SIDED SCIATICA: Primary | ICD-10-CM

## 2020-03-05 PROCEDURE — 97112 NEUROMUSCULAR REEDUCATION: CPT

## 2020-03-05 PROCEDURE — 97110 THERAPEUTIC EXERCISES: CPT

## 2020-03-05 NOTE — PROGRESS NOTES
Daily Note     Today's date: 3/5/2020  Patient name: Mei Lewis  : 1955  MRN: [id number]  Referring provider: Rohini Lua MD  Dx:   Encounter Diagnosis     ICD-10-CM    1  Acute left-sided low back pain with left-sided sciatica M54 42    2  Cervical radiculopathy M54 12                   Subjective: Patient reports feeling like his is walking a little better  Numbness into hands continue  Objective: See treatment diary below      Assessment: Berenicedangelorigo Erp III continues with some progress towards goals  Patient numbness appears to be continuing but ambulation is improving  Patient was able to sit to stand with min A of 2 versus prior mod A of 2  he required moderate verbal cueing to complete exercises appropriate form and intensity  Patient function should improve with continued treatments  Plan: Continue per plan of care  Precautions: Fall risk     Daily Treatment Diary:        Initial Evaluation Date: 20  Compliance 2/18   2/20   2/27   3/2  3             Visit Number 1 2   3   4  5             Re-Eval  IE no   -   n  n          MC   Foto Captured Y no   -   n  n                 Manual  2/18   2/20   2/27   3/2  3/                                                                                                                            Exercise Diary  2/18   2/20 2/27   3/2  3/             Self caudal distraction 10x10"   10x10"   10x10" c lev scap 10x10" c lev scap 10x10" c lev scap             C/S AROM 20x ea   20x ea   20x ea 20xea  20x ea             Scap retraction 20x5"   20x5"   20x5"   20x5"  20x5"             Doorway stretch 4x30"   4x30"   NV   4x30"  4x30"             Seated TB Row/ Ext   gtb 2x10 ea   Pvauox39e  Gvakeg87g  Adiwqq02l             Levator stretch   10"x10   4x20"   30"x4 30"x4             No monies   peach 2x10 5" AROM 20x2"  AROM  30x2"  AROM  30x2"             Posture Edu   5'   ~5'                 Seated marches   20x    20x  20x Hip ABd/ADd    15x5" ea  15x5" ea  ball 15x5"  Green  15x5"             Glute iso    15x5" 15x5"  15x5"             Sit to stand      ~7x, modAx2   5/3/2  Mod Ax2    5/3/3  Min Ax2                                                                                                                                                                                                      Modalities  2/18     2/27   3/2  3/5

## 2020-03-09 ENCOUNTER — OFFICE VISIT (OUTPATIENT)
Dept: PHYSICAL THERAPY | Facility: CLINIC | Age: 65
End: 2020-03-09
Payer: MEDICARE

## 2020-03-09 DIAGNOSIS — M54.42 ACUTE LEFT-SIDED LOW BACK PAIN WITH LEFT-SIDED SCIATICA: Primary | ICD-10-CM

## 2020-03-09 DIAGNOSIS — M54.12 CERVICAL RADICULOPATHY: ICD-10-CM

## 2020-03-09 PROCEDURE — 97112 NEUROMUSCULAR REEDUCATION: CPT | Performed by: PHYSICAL THERAPIST

## 2020-03-09 PROCEDURE — 97110 THERAPEUTIC EXERCISES: CPT | Performed by: PHYSICAL THERAPIST

## 2020-03-09 NOTE — PROGRESS NOTES
Daily Note     Today's date: 3/9/2020  Patient name: Sigrid Ahumada  : 1955  MRN: [id number]  Referring provider: Mayra Mo MD  Dx:   Encounter Diagnosis     ICD-10-CM    1  Acute left-sided low back pain with left-sided sciatica M54 42    2  Cervical radiculopathy M54 12                   Subjective: Pt reports that he went to the doctor on Friday and got a series of blood work done to hopefully determine recent decline in function  Dr mentioned wanting pt to increase frequency of PT but pt is unwilling to consider that at this time  Objective: See treatment diary below      Assessment: Tolerated treatment well  Patient demonstrated fatigue post treatment and would benefit from continued PT  Pt was able to tolerate increased repetitions of exercises today without needing increased assistance  Plan: Continue per plan of care  Patient was treated by HAZEL Guillaume under direct supervision of Donald Hull, PT, DPT          Precautions: Fall risk     Daily Treatment Diary:     Initial Evaluation Date: 20  Compliance 2/18   2/20   2/27   3/2   3/5  3/9           Visit Number 1 2   3   4   5  6           Re-Eval  IE no   -   n   n  N        MC   Foto Captured Y no   -   n   n  N               Manual  2/18   2/20   2/27   3/2   3/5  3/9                                                                                                                          Exercise Diary  2/18   2/20 2/27   3/2   3/5  3/9           Self caudal distraction 10x10"   10x10"   10x10" c lev scap 10x10" c lev scap 10x10" c lev scap  10x10" c lev scap           C/S AROM 20x ea   20x ea   20x ea 20xea   20x ea  20x ea           Scap retraction 20x5"   20x5"   20x5"   20x5"   20x5"  20x5"           Doorway stretch 4x30"   4x30"   NV   4x30"  4x30"  4x30"           Seated TB Row/ Ext   gtb 2x10 ea   Zsttxu74a  Dfqawr47v  Purple 30x  purple 30x           Levator stretch   10"x10   4x20"   30"x4 30"x4 4x30"           No monies   peach 2x10 5" AROM 20x2"  AROM  30x2"   AROM  30x2"  AROM 30x2"           Posture Edu   5'   ~5'                 Seated marches   20x    20x   20x  20x           Hip ABd/ADd    15x5" ea  15x5" ea   ball 15x5"  Green  15x5"  ball 20x5" green 20x5"           Glute iso    15x5" 15x5"  15x5"  20x5"           Sit to stand      ~7x, modAx2   5/3/2  Mod Ax2    5/3/3  Min Ax2  5/5/5 min A x2                                                                                                                                                                                                    Modalities  2/18     2/27   3/2   3/5  3/9

## 2020-03-12 ENCOUNTER — OFFICE VISIT (OUTPATIENT)
Dept: PHYSICAL THERAPY | Facility: CLINIC | Age: 65
End: 2020-03-12
Payer: MEDICARE

## 2020-03-12 DIAGNOSIS — M54.42 ACUTE LEFT-SIDED LOW BACK PAIN WITH LEFT-SIDED SCIATICA: Primary | ICD-10-CM

## 2020-03-12 DIAGNOSIS — M54.12 CERVICAL RADICULOPATHY: ICD-10-CM

## 2020-03-12 PROCEDURE — 97110 THERAPEUTIC EXERCISES: CPT | Performed by: PHYSICAL THERAPIST

## 2020-03-12 NOTE — PROGRESS NOTES
Daily Note     Today's date: 3/12/2020  Patient name: Mei Lewis  : 1955  MRN: [id number]  Referring provider: Rohini Lua MD  Dx:   Encounter Diagnosis     ICD-10-CM    1  Acute left-sided low back pain with left-sided sciatica M54 42    2  Cervical radiculopathy M54 12                   Subjective: Pt states he felt good following last session despite increasing repetitions of exercises  Objective: See treatment diary below      Assessment: Tolerated treatment well  Patient required frequent rest breaks due to fatigue throughout session  Attempted sit to stands with 2 HHA rather than pt pushing up from table but he was unable to stand this way  Pt relied more on BL UEs during sit to stands as compared to previous session  Plan: Continue per plan of care  Patient was treated by HAZEL Sanchez under direct supervision of Cory Spain, PT, DPT          Precautions: Fall risk     Daily Treatment Diary:     Initial Evaluation Date: 20  Compliance 2/18   2/20   2/27   3/2   3/5   3/9  3/12         Visit Number 1 2   3   4   5   6  7         Re-Eval  IE no   -   n   n   N  -      MC   Foto Captured Y no   -   n   n   N  -             Manual  2/18   2/20   2/27   3/2   3/5   3/9  3/12                                                                                                                        Exercise Diary  2/18   2/20 2/27   3/2   3/5   3/9  3/12         Self caudal distraction 10x10"   10x10"   10x10" c lev scap 10x10" c lev scap 10x10" c lev scap  10x10" c lev scap  10x10" w/ lev scap         C/S AROM 20x ea   20x ea   20x ea 20xea   20x ea   20x ea  20x ea         Scap retraction 20x5"   20x5"   20x5"   20x5"   20x5"   20x5"  20x5"         Doorway stretch 4x30"   4x30"   NV   4x30"  4x30"   4x30"  4x30"         Seated TB Row/ Ext   gtb 2x10 ea   Mayuiq44a  Dtavbz82g  Purple 30x   purple 30x  Purple 30x ea         Levator stretch   10"x10   4x20"   30"x4 30"x4 4x30"  4x30"         No monies   peach 2x10 5" AROM 20x2"  AROM  30x2"   AROM  30x2"   AROM 30x2"  30x2" AROM         Posture Edu   5'   ~5'                 Seated marches   20x    20x   20x   20x  30x         Hip ABd/ADd    15x5" ea  15x5" ea   ball 15x5"  Green  15x5"   ball 20x5" green 20x5"  Marshell Varney 20x5"  Green 20x5"         Glute iso    15x5" 15x5"  15x5"   20x5"  30x5"         Sit to stand      ~7x, modAx2   5/3/2  Mod Ax2    5/3/3  Min Ax2   5/5/5 min A x2  4/4/4  Min A x 2                                                                                                                                                                                                  Modalities  2/18     2/27   3/2   3/5   3/9 3/12

## 2020-03-16 ENCOUNTER — OFFICE VISIT (OUTPATIENT)
Dept: PHYSICAL THERAPY | Facility: CLINIC | Age: 65
End: 2020-03-16
Payer: MEDICARE

## 2020-03-16 DIAGNOSIS — M54.12 CERVICAL RADICULOPATHY: ICD-10-CM

## 2020-03-16 DIAGNOSIS — M54.42 ACUTE LEFT-SIDED LOW BACK PAIN WITH LEFT-SIDED SCIATICA: Primary | ICD-10-CM

## 2020-03-16 PROCEDURE — 97112 NEUROMUSCULAR REEDUCATION: CPT | Performed by: PHYSICAL THERAPIST

## 2020-03-16 PROCEDURE — 97110 THERAPEUTIC EXERCISES: CPT | Performed by: PHYSICAL THERAPIST

## 2020-03-16 NOTE — PROGRESS NOTES
Daily Note     Today's date: 3/16/2020  Patient name: Laura Fuentes  : 1955  MRN: [id number]  Referring provider: Raven Sanchez MD  Dx:   Encounter Diagnosis     ICD-10-CM    1  Acute left-sided low back pain with left-sided sciatica M54 42    2  Cervical radiculopathy M54 12                   Subjective: Pt received results of blood work with diagnosis of anklyosing spondylitis on Thursday following his PT appointment  Objective: See treatment diary below      Assessment: Tolerated treatment well  Patient continues to make progress with strengthening and endurance exercises  Pt is requiring less manual assistance and verbal encouragement during sit to stand exercises  Pt voiced being upset with ankylosing spondylitis but encouraged pt that PT treatment is often first step following this diagnosis and that he should continue to see functional improvements with continued intervention  Plan: Continue per plan of care  Patient was treated by HAZEL Limon under direct supervision of Melissa Berrios, PT, DPT        Precautions: Fall risk     Daily Treatment Diary:     Initial Evaluation Date: 20  Compliance 2/18   2/20   2/27   3/2   3/5   3/9   3/12  3/16       Visit Number 1 2   3   4   5   6   7  8       Re-Eval  IE no   -   n   n   N   -  -    MC   Foto Captured Y no   -   n   n   N   -  -           Manual  2/18   2/20   2/27   3/2   3/5   3/9   3/12  3/16                                                                                                                      Exercise Diary  2/18   2/20 2/27   3/2   3/5   3/9   3/12  3/16       Self caudal distraction 10x10"   10x10"   10x10" c lev scap 10x10" c lev scap 10x10" c lev scap  10x10" c lev scap  10x10" w/ lev scap  10x10" w/ lev scap       C/S AROM 20x ea   20x ea   20x ea 20xea   20x ea   20x ea   20x ea  20x ea       Scap retraction 20x5"   20x5"   20x5"   20x5"   20x5"   20x5"   20x5"  20x5"       Doorway stretch 4x30"   4x30"   NV   4x30"  4x30"   4x30"   4x30"  4x30"       Seated TB Row/ Ext   gtb 2x10 ea   Ejyxbm61d  Crrsst67h  Purple 30x   purple 30x   Purple 30x ea  purple 30x ea       Levator stretch   10"x10   4x20"   30"x4 30"x4   4x30"   4x30"  4x30"       No monies   peach 2x10 5" AROM 20x2"  AROM  30x2"   AROM  30x2"   AROM 30x2"   30x2" AROM  30x AROM       Posture Edu   5'   ~5'                 Seated marches   20x    20x   20x   20x   30x  30x       Hip ABd/ADd    15x5" ea  15x5" ea   ball 15x5"  Green  15x5"   ball 20x5" green 20x5"   Ball 20x5"  Green 20x5"  Sharri Footman 20x5"  Green 20x5"       Glute iso    15x5" 15x5"  15x5"   20x5"   30x5"  30x5"       Sit to stand      ~7x, modAx2   5/3/2  Mod Ax2    5/3/3  Min Ax2   5/5/5 min A x2   4/4/4  Min A x 2  5/5/5 min A x2                                                                                                                                                                                                 Modalities  2/18 2/27   3/2   3/5   3/9 3/12  3/16

## 2020-03-19 ENCOUNTER — APPOINTMENT (OUTPATIENT)
Dept: PHYSICAL THERAPY | Facility: CLINIC | Age: 65
End: 2020-03-19
Payer: MEDICARE

## 2020-03-23 ENCOUNTER — APPOINTMENT (OUTPATIENT)
Dept: PHYSICAL THERAPY | Facility: CLINIC | Age: 65
End: 2020-03-23
Payer: MEDICARE

## 2020-03-26 ENCOUNTER — APPOINTMENT (OUTPATIENT)
Dept: PHYSICAL THERAPY | Facility: CLINIC | Age: 65
End: 2020-03-26
Payer: MEDICARE

## 2020-03-30 ENCOUNTER — APPOINTMENT (OUTPATIENT)
Dept: PHYSICAL THERAPY | Facility: CLINIC | Age: 65
End: 2020-03-30
Payer: MEDICARE

## 2020-05-05 NOTE — PROGRESS NOTES
DC summary:    At last contact, patient was feeling good and felt he was walking around better  Discussed virtual therapy though he wasn't interested and will f/u prn      Jackline Severs, PT

## 2020-07-01 ENCOUNTER — APPOINTMENT (OUTPATIENT)
Dept: LAB | Facility: HOSPITAL | Age: 65
End: 2020-07-01
Payer: MEDICARE

## 2020-07-01 ENCOUNTER — TRANSCRIBE ORDERS (OUTPATIENT)
Dept: ADMINISTRATIVE | Facility: HOSPITAL | Age: 65
End: 2020-07-01

## 2020-07-01 DIAGNOSIS — R73.09 IMPAIRED GLUCOSE TOLERANCE TEST: ICD-10-CM

## 2020-07-01 DIAGNOSIS — R60.9 EDEMA, UNSPECIFIED TYPE: ICD-10-CM

## 2020-07-01 DIAGNOSIS — M45.0 ANKYLOSING SPONDYLITIS OF MULTIPLE SITES IN SPINE (HCC): Primary | ICD-10-CM

## 2020-07-01 DIAGNOSIS — R73.09 IMPAIRED GLUCOSE TOLERANCE TEST: Primary | ICD-10-CM

## 2020-07-01 DIAGNOSIS — M45.0 ANKYLOSING SPONDYLITIS OF MULTIPLE SITES IN SPINE (HCC): ICD-10-CM

## 2020-07-01 LAB
ALBUMIN SERPL BCP-MCNC: 3.3 G/DL (ref 3.5–5)
ALP SERPL-CCNC: 48 U/L (ref 46–116)
ALT SERPL W P-5'-P-CCNC: 46 U/L (ref 12–78)
ANION GAP SERPL CALCULATED.3IONS-SCNC: 8 MMOL/L (ref 4–13)
AST SERPL W P-5'-P-CCNC: 30 U/L (ref 5–45)
BILIRUB SERPL-MCNC: 0.53 MG/DL (ref 0.2–1)
BUN SERPL-MCNC: 21 MG/DL (ref 5–25)
CALCIUM SERPL-MCNC: 9.3 MG/DL (ref 8.3–10.1)
CHLORIDE SERPL-SCNC: 102 MMOL/L (ref 100–108)
CO2 SERPL-SCNC: 30 MMOL/L (ref 21–32)
CREAT SERPL-MCNC: 0.95 MG/DL (ref 0.6–1.3)
EST. AVERAGE GLUCOSE BLD GHB EST-MCNC: 120 MG/DL
GFR SERPL CREATININE-BSD FRML MDRD: 84 ML/MIN/1.73SQ M
GLUCOSE SERPL-MCNC: 104 MG/DL (ref 65–140)
HBA1C MFR BLD: 5.8 %
HBV CORE IGM SER QL: NORMAL
HBV SURFACE AG SER QL: NORMAL
HCV AB SER QL: NORMAL
POTASSIUM SERPL-SCNC: 3.7 MMOL/L (ref 3.5–5.3)
PROT SERPL-MCNC: 8 G/DL (ref 6.4–8.2)
SODIUM SERPL-SCNC: 140 MMOL/L (ref 136–145)

## 2020-07-01 PROCEDURE — 86480 TB TEST CELL IMMUN MEASURE: CPT

## 2020-07-01 PROCEDURE — 86803 HEPATITIS C AB TEST: CPT

## 2020-07-01 PROCEDURE — 83036 HEMOGLOBIN GLYCOSYLATED A1C: CPT

## 2020-07-01 PROCEDURE — 80053 COMPREHEN METABOLIC PANEL: CPT

## 2020-07-01 PROCEDURE — 86705 HEP B CORE ANTIBODY IGM: CPT

## 2020-07-01 PROCEDURE — 36415 COLL VENOUS BLD VENIPUNCTURE: CPT

## 2020-07-01 PROCEDURE — 87340 HEPATITIS B SURFACE AG IA: CPT

## 2020-07-02 LAB
GAMMA INTERFERON BACKGROUND BLD IA-ACNC: 0.03 IU/ML
M TB IFN-G BLD-IMP: NEGATIVE
M TB IFN-G CD4+ BCKGRND COR BLD-ACNC: -0.01 IU/ML
M TB IFN-G CD4+ BCKGRND COR BLD-ACNC: -0.01 IU/ML
MITOGEN IGNF BCKGRD COR BLD-ACNC: >10 IU/ML

## 2021-03-30 DIAGNOSIS — Z23 ENCOUNTER FOR IMMUNIZATION: ICD-10-CM

## 2023-01-05 DIAGNOSIS — Z82.49 FAMILY HISTORY OF ISCHEMIC HEART DISEASE AND OTHER DISEASES OF THE CIRCULATORY SYSTEM: ICD-10-CM

## 2023-01-14 ENCOUNTER — HOSPITAL ENCOUNTER (OUTPATIENT)
Dept: NON INVASIVE DIAGNOSTICS | Facility: HOSPITAL | Age: 68
Discharge: HOME | End: 2023-01-14
Attending: INTERNAL MEDICINE

## 2023-01-14 VITALS
WEIGHT: 315 LBS | HEIGHT: 74 IN | SYSTOLIC BLOOD PRESSURE: 146 MMHG | BODY MASS INDEX: 40.43 KG/M2 | DIASTOLIC BLOOD PRESSURE: 74 MMHG

## 2023-01-14 DIAGNOSIS — Z82.49 FAMILY HISTORY OF ISCHEMIC HEART DISEASE AND OTHER DISEASES OF THE CIRCULATORY SYSTEM: ICD-10-CM

## 2023-01-14 LAB
AORTIC ROOT: 3.7 CM
APICAL FOUR CHAMBER EJECTION FRACTION: 77 %
ASCENDING AORTA: 4 CM
E WAVE DECELERATION TIME: 228 MS
FRACTIONAL SHORTENING: 33 % (ref 28–44)
INTERVENTRICULAR SEPTUM IN DIASTOLE (PARASTERNAL SHORT AXIS VIEW): 1.1 CM
INTERVENTRICULAR SEPTUM: 1.1 CM (ref 0.6–1.1)
LAAS-AP2: 19 CM2
LAAS-AP4: 14.2 CM2
LEFT ATRIUM SIZE: 3.4 CM
LEFT INTERNAL DIMENSION IN SYSTOLE: 2.9 CM (ref 2.1–4)
LEFT VENTRICLE DIASTOLIC VOLUME (MOD BIPLANE): 72 ML
LEFT VENTRICLE SYSTOLIC VOLUME (MOD BIPLANE): 18 ML
LEFT VENTRICULAR INTERNAL DIMENSION IN DIASTOLE: 4.3 CM (ref 3.5–6)
LEFT VENTRICULAR POSTERIOR WALL IN END DIASTOLE: 1.1 CM
LEFT VENTRICULAR STROKE VOLUME: 50 ML
LV EF: 75 %
LVSV (TEICH): 50 ML
MV E'TISSUE VEL-LAT: 12 CM/S
MV E'TISSUE VEL-SEP: 11 CM/S
MV PEAK A VEL: 1.11 M/S
MV PEAK E VEL: 99 CM/S
MV STENOSIS PRESSURE HALF TIME: 66 MS
MV VALVE AREA P 1/2 METHOD: 3.33 CM2
RIGHT ATRIAL 2D VOLUME: 28 ML
RIGHT ATRIUM AREA SYSTOLE A4C: 12.7 CM2
RIGHT VENTRICLE ID DIMENSION: 5.2 CM
SL CV LEFT ATRIUM LENGTH A2C: 5.5 CM
SL CV PED ECHO LEFT VENTRICLE DIASTOLIC VOLUME (MOD BIPLANE) 2D: 82 ML
SL CV PED ECHO LEFT VENTRICLE SYSTOLIC VOLUME (MOD BIPLANE) 2D: 32 ML

## 2023-01-16 LAB
AORTIC ROOT: 3.7 CM
APICAL FOUR CHAMBER EJECTION FRACTION: 77 %
ASCENDING AORTA: 4 CM
E WAVE DECELERATION TIME: 228 MS
E/A RATIO: 0.89
FRACTIONAL SHORTENING: 33 (ref 28–44)
INTERVENTRICULAR SEPTUM IN DIASTOLE (PARASTERNAL SHORT AXIS VIEW): 1.1 CM
INTERVENTRICULAR SEPTUM: 1.1 CM (ref 0.6–1.1)
IVC: 2.1 MM
LAAS-AP2: 19 CM2
LAAS-AP4: 14.2 CM2
LEFT ATRIUM SIZE: 3.4 CM
LEFT ATRIUM VOLUME INDEX (MOD BIPLANE): 13.1
LEFT INTERNAL DIMENSION IN SYSTOLE: 2.9 CM (ref 2.1–4)
LEFT VENTRICLE DIASTOLIC VOLUME (MOD BIPLANE): 72 ML
LEFT VENTRICLE SYSTOLIC VOLUME (MOD BIPLANE): 18 ML
LEFT VENTRICULAR INTERNAL DIMENSION IN DIASTOLE: 4.3 CM (ref 3.5–6)
LEFT VENTRICULAR POSTERIOR WALL IN END DIASTOLE: 1.1 CM
LEFT VENTRICULAR STROKE VOLUME: 50 ML
LV EF: 75 %
LVSV (TEICH): 50 ML
MV E'TISSUE VEL-LAT: 12 CM/S
MV E'TISSUE VEL-SEP: 11 CM/S
MV PEAK A VEL: 1.11 M/S
MV PEAK E VEL: 99 CM/S
MV STENOSIS PRESSURE HALF TIME: 66 MS
MV VALVE AREA P 1/2 METHOD: 3.3
RA PRESSURE ESTIMATED: 3 MMHG
RIGHT ATRIAL 2D VOLUME: 28 ML
RIGHT ATRIUM AREA SYSTOLE A4C: 12.7 CM2
RIGHT VENTRICLE ID DIMENSION: 5.2 CM
SL CV LEFT ATRIUM LENGTH A2C: 5.5 CM
SL CV LV EF: 70
SL CV PED ECHO LEFT VENTRICLE DIASTOLIC VOLUME (MOD BIPLANE) 2D: 82 ML
SL CV PED ECHO LEFT VENTRICLE SYSTOLIC VOLUME (MOD BIPLANE) 2D: 32 ML

## 2023-12-14 ENCOUNTER — TELEPHONE (OUTPATIENT)
Age: 68
End: 2023-12-14

## 2023-12-14 RX ORDER — SIMVASTATIN 10 MG
10 TABLET ORAL DAILY
COMMUNITY
Start: 2023-12-14

## 2023-12-14 RX ORDER — POTASSIUM CHLORIDE 20 MEQ/1
20 TABLET, EXTENDED RELEASE ORAL 3 TIMES DAILY
COMMUNITY

## 2023-12-14 NOTE — TELEPHONE ENCOUNTER
Spoke with patient and rescheduled him for 12/21/23 at 2:30 pm with Dr. Ayaka Cheney. Patient verbalized understanding of changing appointment.

## 2023-12-14 NOTE — TELEPHONE ENCOUNTER
New Patient    What is the reason for the patient’s appointment?: NP- ref from PCP for Elevated PSA - 9.07    What office location does the patient prefer?: [place]     Have patient records been requested?:  If No, are the records showing in Epic: PCP will be faxing over patients records. HISTORY:   Has the patient had any previous Urologist(s)?: no     Patient is now schedule on 1/16/24 with  at our [place] office. Patients PCP is concerned about the PSA levels being elevated and are wondering if there is any way patient can be seen sooner. Please review and call patient if you are able to schedule him sooner. He is on waitlist at this time.

## 2023-12-21 ENCOUNTER — OFFICE VISIT (OUTPATIENT)
Dept: UROLOGY | Facility: CLINIC | Age: 68
End: 2023-12-21
Payer: MEDICARE

## 2023-12-21 VITALS
SYSTOLIC BLOOD PRESSURE: 150 MMHG | OXYGEN SATURATION: 95 % | DIASTOLIC BLOOD PRESSURE: 68 MMHG | HEIGHT: 74 IN | RESPIRATION RATE: 20 BRPM | HEART RATE: 77 BPM | BODY MASS INDEX: 40.43 KG/M2 | TEMPERATURE: 97.7 F | WEIGHT: 315 LBS

## 2023-12-21 DIAGNOSIS — E66.01 CLASS 3 SEVERE OBESITY DUE TO EXCESS CALORIES WITHOUT SERIOUS COMORBIDITY WITH BODY MASS INDEX (BMI) OF 50.0 TO 59.9 IN ADULT (HCC): ICD-10-CM

## 2023-12-21 DIAGNOSIS — R97.20 ELEVATED PSA: Primary | ICD-10-CM

## 2023-12-21 DIAGNOSIS — R82.90 CLOUDY URINE: ICD-10-CM

## 2023-12-21 LAB
SL AMB  POCT GLUCOSE, UA: NEGATIVE
SL AMB LEUKOCYTE ESTERASE,UA: ABNORMAL
SL AMB POCT BILIRUBIN,UA: NEGATIVE
SL AMB POCT BLOOD,UA: ABNORMAL
SL AMB POCT CLARITY,UA: CLEAR
SL AMB POCT COLOR,UA: YELLOW
SL AMB POCT KETONES,UA: NEGATIVE
SL AMB POCT NITRITE,UA: POSITIVE
SL AMB POCT PH,UA: 6
SL AMB POCT SPECIFIC GRAVITY,UA: 1.02
SL AMB POCT URINE PROTEIN: ABNORMAL
SL AMB POCT UROBILINOGEN: 0.2

## 2023-12-21 PROCEDURE — 87086 URINE CULTURE/COLONY COUNT: CPT | Performed by: UROLOGY

## 2023-12-21 PROCEDURE — 87077 CULTURE AEROBIC IDENTIFY: CPT | Performed by: UROLOGY

## 2023-12-21 PROCEDURE — 81003 URINALYSIS AUTO W/O SCOPE: CPT | Performed by: UROLOGY

## 2023-12-21 PROCEDURE — 87186 SC STD MICRODIL/AGAR DIL: CPT | Performed by: UROLOGY

## 2023-12-21 PROCEDURE — 99203 OFFICE O/P NEW LOW 30 MIN: CPT | Performed by: UROLOGY

## 2023-12-21 RX ORDER — IBUPROFEN 200 MG
TABLET ORAL
COMMUNITY
Start: 2014-10-02

## 2023-12-21 RX ORDER — CIPROFLOXACIN 500 MG/1
500 TABLET, FILM COATED ORAL EVERY 12 HOURS SCHEDULED
Qty: 14 TABLET | Refills: 0 | Status: SHIPPED | OUTPATIENT
Start: 2023-12-21 | End: 2023-12-31

## 2023-12-21 RX ORDER — CEFADROXIL 500 MG/1
CAPSULE ORAL
COMMUNITY

## 2023-12-21 NOTE — PROGRESS NOTES
UROLOGY PROGRESS NOTE         NAME: Mik Echeverria III  AGE: 68 y.o. SEX: male  : 1955   MRN: [id number]    DATE: 2023  TIME: 3:23 PM    Assessment and Plan      Impression:   1. Elevated PSA  -     POCT urine dip auto non-scope    PSA of 9.07.  Mild BPH symptoms.  Urine looks like it is infected.     Plan: Patient has no symptoms of infection.  We will start him on some Cipro 100 mg twice daily for 10 days.  Will check a PSA in 3 weeks and see him back in a month.  Urine culture was sent.      Chief Complaint   No chief complaint on file.    History of Present Illness     HPI: Mik Echeverria III is a 68 y.o. year old male who presents with ablated PSA 9.07.  He has had PSAs in the past that were normal.  I do not see them in the chart.  His urinalysis is positive for nitrates and leukocyte esterase.  He does not have any symptoms of UTI.  He has nocturia 1-2 times per night generally has some frequency when he takes his Lasix.  No dysuria no fever chills no nausea or vomiting or pain.  No family history of prostate cancer.  No history of BPH other than his mild voiding symptoms that he has now.  No history of UTIs in the past.  No history of stones.              The following portions of the patient's history were reviewed and updated as appropriate: allergies, current medications, past family history, past medical history, past social history, past surgical history and problem list.  Past Medical History:   Diagnosis Date    Abnormal glucose     Ankylosing spondylitis (HCC)     Basal cell carcinoma     face    Cellulitis     CPAP (continuous positive airway pressure) dependence     CVD (cardiovascular disease)     Elevated PSA     HTN (hypertension)     Hyperlipidemia     Lymphadenopathy     Obesity     JOSE (obstructive sleep apnea)      Past Surgical History:   Procedure Laterality Date    FRACTURE SURGERY Left     cheekbone    HERNIA REPAIR      umbilical    WISDOM TOOTH EXTRACTION    "    shoulder  Review of Systems     Const: Denies chills, fever and weight loss.  CV: Denies chest pain.  Resp: Denies SOB.  GI: Denies abdominal pain, nausea and vomiting.  : Denies symptoms other than stated above.  Musculo: Denies back pain.    Objective   /68   Pulse 77   Temp 97.7 °F (36.5 °C)   Resp 20   Ht 6' 2\" (1.88 m)   Wt (!) 188 kg (415 lb 3.2 oz)   SpO2 95%   BMI 53.31 kg/m²     Physical Exam  Const: Appears healthy and well developed. No signs of acute distress present.  Resp: Respirations are regular and unlabored.   CV: Rate is regular. Rhythm is regular.  Abdomen: Abdomen is soft, nontender, and nondistended. Kidneys are not palpable.  : Benign exam.  Penis testicles epididymis normal.  No hernias no suprapubic tenderness.  Prostate 1+ benign.  Psych: Patient's attitude is cooperative. Mood is normal. Affect is normal.    Current Medications     Current Outpatient Medications:     furosemide (LASIX) 40 mg tablet, , Disp: , Rfl:     ibuprofen (Advil) 200 mg tablet, Take 1 tablet every 6 hours by oral route as needed., Disp: , Rfl:     metolazone (ZAROXOLYN) 5 mg tablet, , Disp: , Rfl:     Multiple Vitamins-Minerals (DAILY MULTIVITAMIN PO), Take 1 tablet by mouth in the morning, Disp: , Rfl:     Multiple Vitamins-Minerals (ONE DAILY COMPLETE FOR MEN PO), , Disp: , Rfl:     cefadroxil (DURICEF) 500 mg capsule, Cefadroxil Oral  .0  BID    active (Patient not taking: Reported on 12/21/2023), Disp: , Rfl:     cyclobenzaprine (FLEXERIL) 10 mg tablet, Take 1 tablet (10 mg total) by mouth 3 (three) times a day as needed for muscle spasms (Patient not taking: Reported on 2/24/2020), Disp: 20 tablet, Rfl: 0    diazepam (VALIUM) 5 mg tablet, Take 1-2 tablets (5-10 mg total) by mouth every 8 (eight) hours as needed for anxiety for up to 10 days, Disp: 20 tablet, Rfl: 0    furosemide (LASIX) 40 mg tablet, furosemide 40 mg tablet (Patient not taking: Reported on 12/21/2023), Disp: , Rfl:     " metolazone (ZAROXOLYN) 5 mg tablet, metolazone 5 mg tablet (Patient not taking: Reported on 12/21/2023), Disp: , Rfl:     oxyCODONE (ROXICODONE) 5 mg immediate release tablet, Take 1-2 tablets (5-10 mg total) by mouth every 4 (four) hours as needed for moderate pain for up to 8 dosesMax Daily Amount: 60 mg (Patient not taking: Reported on 12/21/2023), Disp: 15 tablet, Rfl: 0    potassium chloride (K-DUR,KLOR-CON) 20 mEq tablet, Take 20 mEq by mouth 3 (three) times a day (Patient not taking: Reported on 12/21/2023), Disp: , Rfl:     simvastatin (ZOCOR) 10 mg tablet, Take 10 mg by mouth in the morning (Patient not taking: Reported on 12/21/2023), Disp: , Rfl:         Frank D'Amico, MD

## 2023-12-23 LAB — BACTERIA UR CULT: ABNORMAL

## 2024-01-11 ENCOUNTER — APPOINTMENT (OUTPATIENT)
Dept: LAB | Facility: HOSPITAL | Age: 69
End: 2024-01-11
Payer: MEDICARE

## 2024-01-11 DIAGNOSIS — R97.20 ELEVATED PSA: ICD-10-CM

## 2024-01-11 LAB — PSA SERPL-MCNC: 2.71 NG/ML (ref 0–4)

## 2024-01-11 PROCEDURE — 84153 ASSAY OF PSA TOTAL: CPT

## 2024-01-11 PROCEDURE — 36415 COLL VENOUS BLD VENIPUNCTURE: CPT

## 2024-01-22 RX ORDER — DICLOFENAC SODIUM 75 MG/1
75 TABLET, DELAYED RELEASE ORAL 2 TIMES DAILY
COMMUNITY

## 2024-01-24 ENCOUNTER — OFFICE VISIT (OUTPATIENT)
Dept: UROLOGY | Facility: CLINIC | Age: 69
End: 2024-01-24
Payer: MEDICARE

## 2024-01-24 VITALS
WEIGHT: 315 LBS | BODY MASS INDEX: 40.43 KG/M2 | DIASTOLIC BLOOD PRESSURE: 76 MMHG | HEART RATE: 75 BPM | RESPIRATION RATE: 20 BRPM | SYSTOLIC BLOOD PRESSURE: 146 MMHG | HEIGHT: 74 IN | OXYGEN SATURATION: 96 % | TEMPERATURE: 97.9 F

## 2024-01-24 DIAGNOSIS — R97.20 ELEVATED PSA: Primary | ICD-10-CM

## 2024-01-24 LAB
SL AMB  POCT GLUCOSE, UA: ABNORMAL
SL AMB LEUKOCYTE ESTERASE,UA: ABNORMAL
SL AMB POCT BILIRUBIN,UA: ABNORMAL
SL AMB POCT BLOOD,UA: ABNORMAL
SL AMB POCT CLARITY,UA: CLEAR
SL AMB POCT COLOR,UA: YELLOW
SL AMB POCT KETONES,UA: ABNORMAL
SL AMB POCT NITRITE,UA: ABNORMAL
SL AMB POCT PH,UA: 7
SL AMB POCT SPECIFIC GRAVITY,UA: 1.02
SL AMB POCT URINE PROTEIN: ABNORMAL
SL AMB POCT UROBILINOGEN: 0.2

## 2024-01-24 PROCEDURE — 99213 OFFICE O/P EST LOW 20 MIN: CPT | Performed by: UROLOGY

## 2024-01-24 PROCEDURE — 81003 URINALYSIS AUTO W/O SCOPE: CPT | Performed by: UROLOGY

## 2024-01-24 NOTE — PROGRESS NOTES
UROLOGY PROGRESS NOTE         NAME: Mik Echeverria III  AGE: 68 y.o. SEX: male  : 1955   MRN: [id number]    DATE: 2024  TIME: 9:54 AM    Assessment and Plan      Impression:   1. Elevated PSA    Resolved UTI.  PSA back to normal.     Plan: Patient will return on a as needed basis.  His family doctor will continue to do yearly checkup with a PSA.  There is BPH symptoms are mild      Chief Complaint     Chief Complaint   Patient presents with    Follow-up     4 week return visit. PSA 2.71 on 24     History of Present Illness     HPI: Mik Echeverria III is a 68 y.o. year old male who presents with history of elevated PSA.  He did have a UTI E. coli.  He was treated and his PSA has come down to 2.7.  The patient was basically asymptomatic with his UTI symptoms.  He has good flow.  He has nocturia 1 time per night.  No urgency or frequency.  No odor to the urine.  No fever or chills.  No pain.    UA negative today.              The following portions of the patient's history were reviewed and updated as appropriate: allergies, current medications, past family history, past medical history, past social history, past surgical history and problem list.  Past Medical History:   Diagnosis Date    Abnormal glucose     Ankylosing spondylitis (HCC)     Basal cell carcinoma     face    Cellulitis     CPAP (continuous positive airway pressure) dependence     CVD (cardiovascular disease)     Elevated PSA     HTN (hypertension)     Hyperlipidemia     Lymphadenopathy     Obesity     JOSE (obstructive sleep apnea)      Past Surgical History:   Procedure Laterality Date    FRACTURE SURGERY Left     cheekbone    HERNIA REPAIR      umbilical    WISDOM TOOTH EXTRACTION       shoulder  Review of Systems     Const: Denies chills, fever and weight loss.  CV: Denies chest pain.  Resp: Denies SOB.  GI: Denies abdominal pain, nausea and vomiting.  : Denies symptoms other than stated above.  Musculo: Denies back  "pain.    Objective   /76   Pulse 75   Temp 97.9 °F (36.6 °C) (Tympanic)   Resp 20   Ht 6' 2\" (1.88 m)   Wt (!) 190 kg (418 lb)   SpO2 96%   BMI 53.67 kg/m²     Physical Exam  Const: Appears healthy and well developed. No signs of acute distress present.  Resp: Respirations are regular and unlabored.   CV: Rate is regular. Rhythm is regular.  Abdomen: Abdomen is soft, nontender, and nondistended. Kidneys are not palpable.  : Penis testicles epididymis normal.  No SP tenderness no CVA tenderness  Psych: Patient's attitude is cooperative. Mood is normal. Affect is normal.    Current Medications     Current Outpatient Medications:     furosemide (LASIX) 40 mg tablet, Take 40 mg by mouth daily, Disp: , Rfl:     ibuprofen (Advil) 200 mg tablet, Take 1 tablet every 6 hours by oral route as needed., Disp: , Rfl:     metolazone (ZAROXOLYN) 5 mg tablet, Take 5 mg by mouth 3 (three) times a week, Disp: , Rfl:     Multiple Vitamins-Minerals (ONE DAILY COMPLETE FOR MEN PO), , Disp: , Rfl:     simvastatin (ZOCOR) 10 mg tablet, Take 10 mg by mouth in the morning, Disp: , Rfl:     cefadroxil (DURICEF) 500 mg capsule, Cefadroxil Oral  .0  BID    active (Patient not taking: Reported on 12/21/2023), Disp: , Rfl:     cyclobenzaprine (FLEXERIL) 10 mg tablet, Take 1 tablet (10 mg total) by mouth 3 (three) times a day as needed for muscle spasms (Patient not taking: Reported on 2/24/2020), Disp: 20 tablet, Rfl: 0    diazepam (VALIUM) 5 mg tablet, Take 1-2 tablets (5-10 mg total) by mouth every 8 (eight) hours as needed for anxiety for up to 10 days, Disp: 20 tablet, Rfl: 0    diclofenac (VOLTAREN) 75 mg EC tablet, Take 75 mg by mouth 2 (two) times a day (Patient not taking: Reported on 1/24/2024), Disp: , Rfl:     furosemide (LASIX) 40 mg tablet, furosemide 40 mg tablet (Patient not taking: Reported on 12/21/2023), Disp: , Rfl:     metolazone (ZAROXOLYN) 5 mg tablet, metolazone 5 mg tablet (Patient not taking: " Reported on 12/21/2023), Disp: , Rfl:     Multiple Vitamins-Minerals (DAILY MULTIVITAMIN PO), Take 1 tablet by mouth in the morning (Patient not taking: Reported on 1/24/2024), Disp: , Rfl:     oxyCODONE (ROXICODONE) 5 mg immediate release tablet, Take 1-2 tablets (5-10 mg total) by mouth every 4 (four) hours as needed for moderate pain for up to 8 dosesMax Daily Amount: 60 mg (Patient not taking: Reported on 12/21/2023), Disp: 15 tablet, Rfl: 0    potassium chloride (K-DUR,KLOR-CON) 20 mEq tablet, Take 20 mEq by mouth 3 (three) times a day (Patient not taking: Reported on 12/21/2023), Disp: , Rfl:         Frank D'Amico, MD